# Patient Record
Sex: MALE | Race: WHITE | Employment: FULL TIME | URBAN - METROPOLITAN AREA
[De-identification: names, ages, dates, MRNs, and addresses within clinical notes are randomized per-mention and may not be internally consistent; named-entity substitution may affect disease eponyms.]

---

## 2019-08-01 ENCOUNTER — HOSPITAL ENCOUNTER (EMERGENCY)
Age: 59
Discharge: HOME | End: 2019-08-01
Attending: EMERGENCY MEDICINE
Payer: COMMERCIAL

## 2019-08-01 VITALS
HEART RATE: 71 BPM | WEIGHT: 185 LBS | BODY MASS INDEX: 25.9 KG/M2 | DIASTOLIC BLOOD PRESSURE: 88 MMHG | TEMPERATURE: 98 F | OXYGEN SATURATION: 99 % | SYSTOLIC BLOOD PRESSURE: 174 MMHG | RESPIRATION RATE: 16 BRPM | HEIGHT: 71 IN

## 2019-08-01 DIAGNOSIS — K40.90 RIGHT INGUINAL HERNIA: Primary | ICD-10-CM

## 2019-08-01 LAB
BILIRUB UR QL STRIP.AUTO: NEGATIVE
CLARITY UR REFRACT.AUTO: CLEAR
COLOR UR AUTO: YELLOW
GLUCOSE UR STRIP.AUTO-MCNC: 500 MG/DL
KETONES UR STRIP.AUTO-MCNC: NEGATIVE MG/DL
LEUKOCYTE ESTERASE UR QL STRIP.AUTO: NEGATIVE
NITRITE UR QL STRIP.AUTO: NEGATIVE
PH UR STRIP.AUTO: 6.5 [PH] (ref 4.5–8)
SP GR UR STRIP.AUTO: 1.01 (ref 1–1.03)
UROBILINOGEN UR STRIP.AUTO-MCNC: 0.2 MG/DL

## 2019-08-01 PROCEDURE — 99283 EMERGENCY DEPT VISIT LOW MDM: CPT

## 2019-08-01 PROCEDURE — 81001 URINALYSIS AUTO W/SCOPE: CPT | Performed by: EMERGENCY MEDICINE

## 2019-08-01 RX ORDER — HYDROCODONE BITARTRATE AND ACETAMINOPHEN 5; 325 MG/1; MG/1
1-2 TABLET ORAL EVERY 6 HOURS PRN
Qty: 10 TABLET | Refills: 0 | Status: SHIPPED | OUTPATIENT
Start: 2019-08-01 | End: 2019-08-08

## 2019-08-01 NOTE — ED PROVIDER NOTES
Patient Seen in: [facility] In [place]    History   Patient presents with:  Abdomen/Flank Pain (GI/)    Stated Complaint: RLQ/groin   pain x 1 week- states he was moving and lifting.     HPI    This is a pleasant 54-year-old male pre a right inguinal hernia present that is easily reducible.     ED Course     Labs Reviewed   URINALYSIS WITH CULTURE REFLEX - Abnormal; Notable for the following components:       Result Value    Glucose Urine 500  (*)     Blood Urine Trace-Intact (*)     Pr

## 2019-08-06 ENCOUNTER — OFFICE VISIT (OUTPATIENT)
Dept: SURGERY | Facility: CLINIC | Age: 59
End: 2019-08-06
Payer: COMMERCIAL

## 2019-08-06 VITALS
SYSTOLIC BLOOD PRESSURE: 146 MMHG | WEIGHT: 182 LBS | HEIGHT: 71 IN | HEART RATE: 75 BPM | TEMPERATURE: 99 F | DIASTOLIC BLOOD PRESSURE: 92 MMHG | BODY MASS INDEX: 25.48 KG/M2

## 2019-08-06 DIAGNOSIS — K40.90 RIGHT INGUINAL HERNIA: Primary | ICD-10-CM

## 2019-08-06 PROCEDURE — 99243 OFF/OP CNSLTJ NEW/EST LOW 30: CPT | Performed by: SURGERY

## 2019-08-06 NOTE — H&P
New Patient Visit Note       Active Problems      1.  Right inguinal hernia        Chief Complaint   Patient presents with:  Hernia: rt ing hernia consult, rt groin pain x 3 months      History of Present Illness     Pt seen at the request of [facility] for Negative for apnea, cough, shortness of breath and wheezing. Cardiovascular: Negative for chest pain, palpitations and leg swelling.    Gastrointestinal: Negative for abdominal distention, abdominal pain, anal bleeding, blood in stool, constipation, diar case and wishes to proceed with the procedure under his [facility]. Discussion had between my billing staff and Mr. Yudith Mccarthy (through an interpretor). · All questions answered. No orders of the defined types were placed in this encounter.       Mildred Ramirez

## 2019-08-08 ENCOUNTER — APPOINTMENT (OUTPATIENT)
Dept: LAB | Age: 59
End: 2019-08-08
Attending: SURGERY
Payer: COMMERCIAL

## 2019-08-08 DIAGNOSIS — K40.90 RIGHT INGUINAL HERNIA: ICD-10-CM

## 2019-08-08 LAB
ALBUMIN SERPL-MCNC: 3.6 G/DL (ref 3.4–5)
ALBUMIN/GLOB SERPL: 1 {RATIO} (ref 1–2)
ALP LIVER SERPL-CCNC: 90 U/L (ref 45–117)
ALT SERPL-CCNC: 27 U/L (ref 16–61)
ANION GAP SERPL CALC-SCNC: 6 MMOL/L (ref 0–18)
AST SERPL-CCNC: 17 U/L (ref 15–37)
ATRIAL RATE: 57 BPM
BILIRUB SERPL-MCNC: 0.9 MG/DL (ref 0.1–2)
BUN BLD-MCNC: 14 MG/DL (ref 7–18)
BUN/CREAT SERPL: 14.3 (ref 10–20)
CALCIUM BLD-MCNC: 10 MG/DL (ref 8.5–10.1)
CHLORIDE SERPL-SCNC: 100 MMOL/L (ref 98–112)
CO2 SERPL-SCNC: 28 MMOL/L (ref 21–32)
CREAT BLD-MCNC: 0.98 MG/DL (ref 0.7–1.3)
GLOBULIN PLAS-MCNC: 3.6 G/DL (ref 2.8–4.4)
GLUCOSE BLD-MCNC: 115 MG/DL (ref 70–99)
M PROTEIN MFR SERPL ELPH: 7.2 G/DL (ref 6.4–8.2)
OSMOLALITY SERPL CALC.SUM OF ELEC: 279 MOSM/KG (ref 275–295)
P AXIS: 57 DEGREES
P-R INTERVAL: 176 MS
POTASSIUM SERPL-SCNC: 3.7 MMOL/L (ref 3.5–5.1)
Q-T INTERVAL: 424 MS
QRS DURATION: 92 MS
QTC CALCULATION (BEZET): 412 MS
R AXIS: 56 DEGREES
SODIUM SERPL-SCNC: 134 MMOL/L (ref 136–145)
T AXIS: 48 DEGREES
VENTRICULAR RATE: 57 BPM

## 2019-08-08 PROCEDURE — 36415 COLL VENOUS BLD VENIPUNCTURE: CPT

## 2019-08-08 PROCEDURE — 80053 COMPREHEN METABOLIC PANEL: CPT

## 2019-08-08 PROCEDURE — 93010 ELECTROCARDIOGRAM REPORT: CPT | Performed by: INTERNAL MEDICINE

## 2019-08-08 PROCEDURE — 93005 ELECTROCARDIOGRAM TRACING: CPT

## 2019-08-29 ENCOUNTER — OFFICE VISIT (OUTPATIENT)
Dept: SURGERY | Facility: CLINIC | Age: 59
End: 2019-08-29

## 2019-08-29 VITALS
BODY MASS INDEX: 25.48 KG/M2 | TEMPERATURE: 98 F | DIASTOLIC BLOOD PRESSURE: 92 MMHG | HEART RATE: 76 BPM | HEIGHT: 71 IN | WEIGHT: 182 LBS | SYSTOLIC BLOOD PRESSURE: 144 MMHG

## 2019-08-29 DIAGNOSIS — K40.90 RIGHT INGUINAL HERNIA: Primary | ICD-10-CM

## 2019-08-29 PROCEDURE — 99024 POSTOP FOLLOW-UP VISIT: CPT | Performed by: PHYSICIAN ASSISTANT

## 2019-08-29 NOTE — PROGRESS NOTES
Post Operative Visit Note       Active Problems  1.  Right inguinal hernia         Chief Complaint   Patient presents with:  Post-Op: right inguinal hernia repair with RRP on 8/16- c/o right sided abdominal pain at surgical sit and right testicle, denies sw during today. Review of Systems   Constitutional: Negative for chills, diaphoresis, fatigue, fever and unexpected weight change. HENT: Negative for hearing loss, nosebleeds, sore throat and trouble swallowing.     Respiratory: Negative for apnea, cough, place, and time. Skin: Skin is warm and dry. He is not diaphoretic. No erythema. Nursing note and vitals reviewed.           Assessment   Right inguinal hernia  (primary encounter diagnosis)      Plan   At today's office visit I discussed with the patie

## 2019-09-18 ENCOUNTER — TELEPHONE (OUTPATIENT)
Dept: SURGERY | Facility: CLINIC | Age: 59
End: 2019-09-18

## 2019-09-19 ENCOUNTER — TELEPHONE (OUTPATIENT)
Dept: SURGERY | Facility: CLINIC | Age: 59
End: 2019-09-19

## (undated) NOTE — ED AVS SNAPSHOT
Raza Brown   MRN: [id number]    Department:  [facility] in [place]   Date of Visit:  8/1/2019           Disclosure     Insurance plans vary and the physician(s) referred by the ER may not be covered by your plan.  Please contact you tell this physician (or your personal doctor if your instructions are to return to your personal doctor) about any new or lasting problems. The primary care or specialist physician will see patients referred from the [facility] Emergency Department.  Sukh Howell

## (undated) NOTE — LETTER
2019    Return to [facility]    Name: Kevin Mention        : 1960    To Whom It May Concern,    Kevin Mention had surgery on 19 and is:    Able to return to work light duty beginning September 3, 2019. No lifting greater than 20lbs.  Limited bending,